# Patient Record
(demographics unavailable — no encounter records)

---

## 2025-01-29 NOTE — DISCUSSION/SUMMARY
[Medically Refractory (seizure within the last year)] : Medically Refractory (seizure within the last year) [Secondary Generalization] : secondary generalization [Symptomatic] : symptomatic [Risks Associated with Driving/[place] Law] : As per my usual protocol, the patient was advised in regards to risks and driving privileges associated with the [place] Guidelines.  [Safety Recommendations] : The patient was advised in regards to the risk of seizures and general seizure safety recommendations including not to be bathing alone, climbing to high places and operating heavy machinery. [Compliance with Medications] : The importance of compliance with medications was reinforced. [Medication Side Effects] : High frequency and serious potential medication adverse effects were reviewed with the patient, including but not exclusive to psychiatric effects.  Information sheets on medication side effects were made available to the patient in our clinic.  The patient or advocate agrees to notify us for any concerns. [Risk of Death] : Risk of death associated with seizures / SUDEP was discussed. [EMU Consent:] : As per our usual protocol, staff discussed video EEG monitoring for the purpose of diagnosis, treatment decisions, teaching, research or publication (if de-identified).  Patient aware recording is continuous (24hrs/day), that they may be under constant observation (with exceptions), and that portions of the video EEG will be stored digitally.  In some cases AEDs may be adjusted to allow for observable seizure activity. The anticipated benefits of the study, the foreseeable risks associated with the procedure including experiencing seizures, which could be more severe than usual. The risk from seizures includes falls, fractures, muscle injury, dental injury, postictal psychiatric symptoms, and heart rate or breathing abnormalities. There will be a risk of experiencing skin irritation or breakdown from the electrodes being placed on the skin.  [Mental Health Evaluation] : Mental health evaluation" was recommended with either our  and psychologist, at [facility] ([facility] ): [phone], or [facility] Intake: [phone] [Inpatient video EEG study ordered with length of stay anticipated in days: _____] : Inpatient video EEG study ordered with length of stay anticipated in days: [unfilled] [Event capture (for localization, differential diagnosis) (typically 3-5 days)] : Event capture (for localization, differential diagnosis) (typically 3-5 days)  [FreeTextEntry1] : 48  RH M  Intractable epilepsy.  Idiopathic, possibly p minor TBI, though unclear, no lesion on MRI.  MRI mild diffuse atrophy present, no clear focal lesion.  PET with bifrontal hypomet.  EEG with left frontotemporal slowing, and bifrontal, left frontal maximal, less frequent right frontal max spikes.  Seizures with right-sided clonus, suggestive of left frontal onset.  Suspect left frontal/limbic involvement, ie orbitofrontal as possibility.  P ictal mood change also suspected with some aggressive behaviors.  Clinically seizures in clusters, multiple ER visits/utilization, including prior ICU admissions with aspiration.   Neutropenia with VPA in past.  Chronic irritability, issues with understanding disease/etiology, frustration.   Cognitive and behavioral issues, possibly progressive.  MRI nonlesional w/ atrophy, PET supportive of frontal focus.   Mild acidosis on ZNS, decreased appetite. wt fluctuating, now decreased. on 4 AEDs, also CBD+ on tox screens  Risperdone 0.5mg qHS, PRN p ictally for agitation and behavioral changes.    Have discussed SUDEP, surgical work up and options.  Appears to be high risk for SUDEP. Discussed P ictal and interictal mood risks/changes and cognitive changes as a result of chronic uncontrolled epilepsy.  If willing to move fwd with surgery/aim for cure, would need to agree to EMU sz capture, npsych testing -> Dr Ely referral given, but patient did not go as yet.  If not a resective candidate I think he would benefit from a vagal nerve stimulator or DBS.  Patient with chronic skepticism, criticism of prior doctors, distrust of the medical system , issues with compliance in past.    [Recommend Against VPA, CBZ/OXC/APT, TPM, PER for reasons stated above and deep concern for potential side effects of these.] Rx:  options for meds include change of LAC to xcopri (may be quite difficult due to access to care issues and need for close supervision with this transition), trial PGB/GBP considered with mild mood stabilizing effect to replace Zonegran,     LAC dose limited by [place] interval prolongation.  Plan: -Continue current ASM regimen- encouraged adequate fluids on ZNS -no seizures for last year reported.  consideration to lower ZNS due to polypharmacy and wt loss. pt/MD will consider  -met with  Anne Lo-  reaching out for assistance; access-a-ride and visiting RN -lifestyle management seems to have had a positive effect on szs -follow up in 4-6 months with Dr Stefano [email or web address]

## 2025-01-29 NOTE — DATA REVIEWED
[de-identified] : 9/2020 [facility] nl [de-identified] : EMU 8/2020 Interictal: \par  Rare sharp waves in the bilateral frontal region, synchronous, maximum Fp1/Fp2\par  Ictal:  No events captured \par  \par  June 2022 EMU Interictal: Infrequent sharp waves in the left frontotemporal region, maximum F7 F9 at times more frontal Fp1 F7 F9\par  Rare frontal max bisynchronous discharges\par  Rare right temporal sharp waves may F8 F10\par  Continuous polymorphic delta and theta slowing in the left temporal region\par  Intermittent polymorphic delta slowing in the right temporal region\par  Runs of GRDA, generalized slowing\par  \par  Ictal: Seizures with early RUE clonus suggestive of left frontal onset, though EEG with very little discernible electrographic changes at clinical onset \par   [de-identified] : 5/2020 IL6 elevated\par  11/2020 LAC 5.6, LEV 15, ZNS 13\par  2/2021: WBC 3.5. BMP, LFT ok. \par  11/2022:  ZNS 25; LAC 8 (400mg); \par  6/2021 WBC 2.1, TFT, BMP ok. LAC 6. LEV 12, ZNS 24 [de-identified] : 9/2020 [facility]: Bifrontal hypo med
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
## 2025-07-30 NOTE — DISCUSSION/SUMMARY
[Medically Refractory (seizure within the last year)] : Medically Refractory (seizure within the last year) [Secondary Generalization] : secondary generalization [Symptomatic] : symptomatic [Risks Associated with Driving/[place] Law] : As per my usual protocol, the patient was advised in regards to risks and driving privileges associated with the [place] Guidelines.  [Safety Recommendations] : The patient was advised in regards to the risk of seizures and general seizure safety recommendations including not to be bathing alone, climbing to high places and operating heavy machinery. [Compliance with Medications] : The importance of compliance with medications was reinforced. [Medication Side Effects] : High frequency and serious potential medication adverse effects were reviewed with the patient, including but not exclusive to psychiatric effects.  Information sheets on medication side effects were made available to the patient in our clinic.  The patient or advocate agrees to notify us for any concerns. [Risk of Death] : Risk of death associated with seizures / SUDEP was discussed. [EMU Consent:] : As per our usual protocol, staff discussed video EEG monitoring for the purpose of diagnosis, treatment decisions, teaching, research or publication (if de-identified).  Patient aware recording is continuous (24hrs/day), that they may be under constant observation (with exceptions), and that portions of the video EEG will be stored digitally.  In some cases AEDs may be adjusted to allow for observable seizure activity. The anticipated benefits of the study, the foreseeable risks associated with the procedure including experiencing seizures, which could be more severe than usual. The risk from seizures includes falls, fractures, muscle injury, dental injury, postictal psychiatric symptoms, and heart rate or breathing abnormalities. There will be a risk of experiencing skin irritation or breakdown from the electrodes being placed on the skin.  [Mental Health Evaluation] : Mental health evaluation" was recommended with either our  and psychologist, at [facility] ([facility] of ): [phone], or [facility] Intake: [phone] [Inpatient video EEG study ordered with length of stay anticipated in days: _____] : Inpatient video EEG study ordered with length of stay anticipated in days: [unfilled] [Event capture (for localization, differential diagnosis) (typically 3-5 days)] : Event capture (for localization, differential diagnosis) (typically 3-5 days)  [FreeTextEntry1] : 49  RH M  Intractable epilepsy.  Idiopathic, possibly p minor TBI, though unclear, no lesion on MRI.  Clinically seizures in clusters, multiple ER visits/utilization, including prior ICU admissions with aspiration.   Neutropenia with VPA in past. Patient with chronic skepticism, criticism of prior doctors, distrust of the medical system , issues with compliance in past.   MRI mild diffuse atrophy present, no clear focal lesion.  PET with bifrontal hypometabolism supportive of frontal focus.   EEG with left frontotemporal slowing, and bifrontal, left frontal maximal, less frequent right frontal max spikes.  Seizures with right-sided clonus, suggestive of left frontal onset.  Suspect left frontal/limbic involvement, ie orbitofrontal as possibility.  P ictal mood change also suspected with some aggressive behaviors.  Chronic irritability, issues with understanding disease/etiology, frustration.   Cognitive and behavioral issues, possibly progressive.  on 4 AEDs, also CBD+ on tox screens Mild acidosis on ZNS, decreased appetite. wt fluctuating, now decreased.  Risperdone 0.5mg qHS, PRN p ictally for agitation and behavioral changes.    Have discussed SUDEP, surgical work up and options.  Appears to be high risk for SUDEP. Discussed P ictal and interictal mood risks/changes and cognitive changes as a result of chronic uncontrolled epilepsy.  If willing to move fwd with surgery/aim for cure, would need to agree to U sz capture, npsych testing -> Dr Ely referral given, but patient did not go as yet.  If not a resective candidate I think he would benefit from a vagal nerve stimulator or DBS.  [Recommend Against VPA, CBZ/OXC/APT, TPM, PER for reasons stated above and deep concern for potential side effects of these.] Rx:  options for meds include change of LAC to xcopri (may be quite difficult due to access to care issues and need for close supervision with this transition), trial PGB/GBP considered with mild mood stabilizing effect to replace Zonegran,     LAC dose limited by [place] interval prolongation.  Plan: -Continue current ASM regimen- encouraged adequate fluids on ZNS -no seizures for last year reported.  consideration to lower ZNS due to polypharmacy and wt loss to 400mg/qhs now 7/2025 -met with  Anne Lo-  , reaching out for assistance; access-a-ride and visiting RN -lifestyle management seems to have had a positive effect on szs -follow up in 6 months with Dr Stefano palomares31min

## 2025-07-30 NOTE — DATA REVIEWED
[de-identified] : 9/2020 [facility] nl [de-identified] : EMU 8/2020 Interictal: \par  Rare sharp waves in the bilateral frontal region, synchronous, maximum Fp1/Fp2\par  Ictal:  No events captured \par  \par  June 2022 EMU Interictal: Infrequent sharp waves in the left frontotemporal region, maximum F7 F9 at times more frontal Fp1 F7 F9\par  Rare frontal max bisynchronous discharges\par  Rare right temporal sharp waves may F8 F10\par  Continuous polymorphic delta and theta slowing in the left temporal region\par  Intermittent polymorphic delta slowing in the right temporal region\par  Runs of GRDA, generalized slowing\par  \par  Ictal: Seizures with early RUE clonus suggestive of left frontal onset, though EEG with very little discernible electrographic changes at clinical onset \par   [de-identified] : 9/2020 [facility]: Bifrontal hypo med [de-identified] : 5/2020 IL6 elevated\par  11/2020 LAC 5.6, LEV 15, ZNS 13\par  2/2021: WBC 3.5. BMP, LFT ok. \par  11/2022:  ZNS 25; LAC 8 (400mg); \par  6/2021 WBC 2.1, TFT, BMP ok. LAC 6. LEV 12, ZNS 24

## 2025-07-30 NOTE — PHYSICAL EXAM
[FreeTextEntry1] : Exam limited via telehealth: MS:  awake, alert, coherent, fluent, comprehension intact, affect stable.  oriented.  slow to respond. CN: EOMI, face symmetrical, grimace, smile symmetrical, eye closure symmetrical, hearing intact grossly. Motor: moving all 4 extremities freely. Coord: FFM and to screen intact  Walking / Sensation deferred. Cardiac/pulmonary/extremity exam deferred via telehealth.
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
